# Patient Record
Sex: MALE | Race: WHITE | NOT HISPANIC OR LATINO | Employment: FULL TIME | URBAN - METROPOLITAN AREA
[De-identification: names, ages, dates, MRNs, and addresses within clinical notes are randomized per-mention and may not be internally consistent; named-entity substitution may affect disease eponyms.]

---

## 2017-04-10 ENCOUNTER — OFFICE VISIT (OUTPATIENT)
Dept: FAMILY MEDICINE | Facility: CLINIC | Age: 52
End: 2017-04-10
Payer: COMMERCIAL

## 2017-04-10 VITALS
WEIGHT: 208.9 LBS | SYSTOLIC BLOOD PRESSURE: 139 MMHG | HEART RATE: 95 BPM | HEIGHT: 66 IN | DIASTOLIC BLOOD PRESSURE: 87 MMHG | TEMPERATURE: 98.7 F | BODY MASS INDEX: 33.57 KG/M2

## 2017-04-10 DIAGNOSIS — Z00.01 ENCOUNTER FOR ROUTINE ADULT MEDICAL EXAM WITH ABNORMAL FINDINGS: ICD-10-CM

## 2017-04-10 DIAGNOSIS — Z12.11 SPECIAL SCREENING FOR MALIGNANT NEOPLASMS, COLON: Primary | ICD-10-CM

## 2017-04-10 DIAGNOSIS — Z13.6 CARDIOVASCULAR SCREENING; LDL GOAL LESS THAN 130: ICD-10-CM

## 2017-04-10 DIAGNOSIS — Z00.00 ROUTINE GENERAL MEDICAL EXAMINATION AT A HEALTH CARE FACILITY: ICD-10-CM

## 2017-04-10 PROCEDURE — 99386 PREV VISIT NEW AGE 40-64: CPT | Performed by: FAMILY MEDICINE

## 2017-04-10 NOTE — NURSING NOTE
Chief Complaint   Patient presents with     Physical       Initial There were no vitals taken for this visit. There is no height or weight on file to calculate BMI.  Medication Reconciliation: complete   Gracy Pearson CMA

## 2017-04-10 NOTE — PROGRESS NOTES
SUBJECTIVE:     CC: Tan Redman is an 52 year old male who presents for preventative health visit.     Healthy Habits:  Annual Exam:  Getting at least 3 servings of Calcium per day:: NO  Bi-annual eye exam:: NO  Dental care twice a year:: NO  Sleep apnea or symptoms of sleep apnea:: None  Diet:: Regular (no restrictions)  Frequency of exercise:: None  Taking medications regularly:: Not Applicable  Medication side effects:: Not applicable  Additional concerns today:: No  Q1: Little interest or pleasure in doing things: 0=Not at all  Q2: Feeling down, depressed or hopeless: 0=Not at all  PHQ-2 Score: 0            Today's PHQ-2 Score:   PHQ-2 ( 1999 Pf[employer]) 4/8/2017   Little interest or pleasure in doing things Not at all   Feeling down, depressed or hopeless Not at all   PHQ-2 Score 0       Abuse: Current or Past(Physical, Sexual or Emotional)- No  Do you feel safe in your environment - Yes    Social History   Substance Use Topics     Smoking status: Never Smoker     Smokeless tobacco: Not on file     Alcohol use Yes      Comment: occassionally     The patient does not drink >3 drinks per day nor >7 drinks per week.    Last PSA: No results found for: PSA     No results for input(s): CHOL, HDL, LDL, TRIG, CHOLHDLRATIO, NHDL in the last 72321 hours.    Reviewed orders with patient. Reviewed health maintenance and updated orders accordingly - Yes    Reviewed and updated as needed this visit by clinical staff  Tobacco  Allergies  Med Hx  Surg Hx  Fam Hx  Soc Hx        Reviewed and updated as needed this visit by Provider            ROS:  C: NEGATIVE for fever, chills, change in weight  I: NEGATIVE for worrisome rashes, moles or lesions  E: NEGATIVE for vision changes or irritation  ENT: NEGATIVE for ear, mouth and throat problems  R: NEGATIVE for significant cough or SOB  CV: NEGATIVE for chest pain, palpitations or peripheral edema  GI: NEGATIVE for nausea, abdominal pain, heartburn, or change in bowel habits    "male: negative for dysuria, hematuria, decreased urinary stream, erectile dysfunction, urethral discharge  M: NEGATIVE for significant arthralgias or myalgia  N: NEGATIVE for weakness, dizziness or paresthesias  P: NEGATIVE for changes in mood or affect    Problem list, Medication list, Allergies, and Medical/Social/Surgical histories reviewed in [facility] and updated as appropriate.  OBJECTIVE:     /87 (BP Location: Right arm, Patient Position: Chair, Cuff Size: Adult Large)  Pulse 95  Temp 98.7  F (37.1  C) (Tympanic)  Ht 5' 5.5\" (1.664 m)  Wt 208 lb 14.4 oz (94.8 kg)  BMI 34.23 kg/m2  EXAM:  GENERAL: healthy, alert and no distress  NECK: no adenopathy, no asymmetry, masses, or scars and thyroid normal to palpation  RESP: lungs clear to auscultation - no rales, rhonchi or wheezes  CV: regular rate and rhythm, normal S1 S2, no S3 or S4, no murmur, click or rub, no peripheral edema and peripheral pulses strong  ABDOMEN: soft, nontender, no hepatosplenomegaly, no masses and bowel sounds normal  MS: no gross musculoskeletal defects noted, no edema    ASSESSMENT/PLAN:     1. Routine general medical examination at a health care facility    - GLUCOSE; Future    2. Encounter for routine adult medical exam with abnormal findings      3. Special screening for malignant neoplasms, colon    - GASTROENTEROLOGY ADULT REF PROCEDURE ONLY    4. CARDIOVASCULAR SCREENING; LDL GOAL LESS THAN 130    - Lipid Profile with reflex to direct LDL; Future    COUNSELING:  Reviewed preventive health counseling, as reflected in patient instructions         reports that he has never smoked. He does not have any smokeless tobacco history on file.    Estimated body mass index is 34.23 kg/(m^2) as calculated from the following:    Height as of this encounter: 5' 5.5\" (1.664 m).    Weight as of this encounter: 208 lb 14.4 oz (94.8 kg).   Weight management plan: Discussed healthy diet and exercise guidelines and patient will follow up in 12 " months in clinic to re-evaluate.    Counseling Resources:  ATP IV Guidelines  Pooled Cohorts Equation Calculator  FRAX Risk Assessment  ICSI Preventive Guidelines  Dietary Guidelines for Americans, 2010  USDA's MyPlate  ASA Prophylaxis  Lung CA Screening    Alcides Almanza MD  [facility] NAFISAAnswpavel for HPI/ROS submitted by the patient on 4/8/2017

## 2017-04-10 NOTE — MR AVS SNAPSHOT
After Visit Summary   4/10/2017    Tan Redmna    MRN: [id number]           Patient Information     Date Of Birth          1965        Visit Information        Provider Department      4/10/2017 5:00 PM Alcides Almanza MD [facility]        Today's Diagnoses     Special screening for malignant neoplasms, colon    -  1    Routine general medical examination at a health care facility        Encounter for routine adult medical exam with abnormal findings        CARDIOVASCULAR SCREENING; LDL GOAL LESS THAN 130          Care Instructions      Preventive Health Recommendations  Male Ages 50 - 64    Yearly exam:             See your health care provider every year in order to  o   Review health changes.   o   Discuss preventive care.    o   Review your medicines if your doctor has prescribed any.     Have a cholesterol test every 5 years, or more frequently if you are at risk for high cholesterol/heart disease.     Have a diabetes test (fasting glucose) every three years. If you are at risk for diabetes, you should have this test more often.     Have a colonoscopy at age 50, or have a yearly FIT test (stool test). These exams will check for colon cancer.      Talk with your health care provider about whether or not a prostate cancer screening test (PSA) is right for you.    You should be tested each year for STDs (sexually transmitted diseases), if you re at risk.     Shots: Get a flu shot each year. Get a tetanus shot every 10 years.     Nutrition:    Eat at least 5 servings of fruits and vegetables daily.     Eat whole-grain bread, whole-wheat pasta and brown rice instead of white grains and rice.     Talk to your provider about Calcium and Vitamin D.     Lifestyle    Exercise for at least 150 minutes a week (30 minutes a day, 5 days a week). This will help you control your weight and prevent disease.     Limit alcohol to one drink per day.     No smoking.     Wear sunscreen to prevent skin  cancer.     See your dentist every six months for an exam and cleaning.     See your eye doctor every 1 to 2 years.          Follow-ups after your visit        Additional Services     GASTROENTEROLOGY ADULT REF PROCEDURE ONLY       Last Lab Result: No results found for: CR  Body mass index is 34.23 kg/(m^2).      Patient will be contacted to schedule procedure.     Please be aware that coverage of these services is subject to the terms and limitations of your health insurance plan.  Call member services at your health plan with any benefit or coverage questions.  Any procedures must be performed at a [place] facility OR coordinated by your clinic's referral office.    Please bring the following with you to your appointment:    (1) Any X-Rays, CTs or MRIs which have been performed.  Contact the facility where they were done to arrange for  prior to your scheduled appointment.    (2) List of current medications   (3) This referral request   (4) Any documents/labs given to you for this referral                  Future tests that were ordered for you today     Open Future Orders        Priority Expected Expires Ordered    Lipid Profile with reflex to direct LDL Routine  4/10/2018 4/10/2017            Who to contact     Normal or non-critical lab and imaging results will be communicated to you by MyChart, letter or phone within 4 business days after the clinic has received the results. If you do not hear from us within 7 days, please contact the clinic through MyChart or phone. If you have a critical or abnormal lab result, we will notify you by phone as soon as possible.  Submit refill requests through [employer] or call your pharmacy and they will forward the refill request to us. Please allow 3 business days for your refill to be completed.          If you need to speak with a  for additional information , please call: [phone]             Additional Information About Your Visit       "  MyChart Information     [employer] gives you secure access to your electronic health record. If you see a primary care provider, you can also send messages to your care team and make appointments. If you have questions, please call your primary care clinic.  If you do not have a primary care provider, please call [phone] and they will assist you.        Care EveryWhere ID     This is your Care EveryWhere ID. This could be used by other organizations to access your [place] medical records  [id number]        Your Vitals Were     Pulse Temperature Height BMI (Body Mass Index)          95 98.7  F (37.1  C) (Tympanic) 5' 5.5\" (1.664 m) 34.23 kg/m2         Blood Pressure from Last 3 Encounters:   04/10/17 139/87    Weight from Last 3 Encounters:   04/10/17 208 lb 14.4 oz (94.8 kg)              We Performed the Following     GASTROENTEROLOGY ADULT REF PROCEDURE ONLY     GLUCOSE        Primary Care Provider    None Specified       No primary provider on file.        Thank you!     Thank you for choosing [facility]  for your care. Our goal is always to provide you with excellent care. Hearing back from our patients is one way we can continue to improve our services. Please take a few minutes to complete the written survey that you may receive in the mail after your visit with us. Thank you!             Your Updated Medication List - Protect others around you: Learn how to safely use, store and throw away your medicines at www.disposemymeds.org.      Notice  As of 4/10/2017  5:45 PM    You have not been prescribed any medications.      "

## 2017-04-10 NOTE — LETTER
[facility]  [address] [address]  [phone]        April 16, 2018    Tan Redman  [address] [place] [address]              Dear Tan Redman    This is to remind you that your FASTING LAB is due.    You may call our office at [phone] to schedule an appointment.    Please disregard this notice if you have already had your labs drawn or made an appointment.        Sincerely,        Alcides Almanza MD

## 2018-05-21 ENCOUNTER — TELEPHONE (OUTPATIENT)
Dept: LAB | Facility: CLINIC | Age: 53
End: 2018-05-21

## 2020-03-10 ENCOUNTER — HEALTH MAINTENANCE LETTER (OUTPATIENT)
Age: 55
End: 2020-03-10

## 2020-12-27 ENCOUNTER — HEALTH MAINTENANCE LETTER (OUTPATIENT)
Age: 55
End: 2020-12-27

## 2021-04-24 ENCOUNTER — HEALTH MAINTENANCE LETTER (OUTPATIENT)
Age: 56
End: 2021-04-24

## 2021-10-09 ENCOUNTER — HEALTH MAINTENANCE LETTER (OUTPATIENT)
Age: 56
End: 2021-10-09

## 2022-05-21 ENCOUNTER — HEALTH MAINTENANCE LETTER (OUTPATIENT)
Age: 57
End: 2022-05-21

## 2022-09-11 ENCOUNTER — HEALTH MAINTENANCE LETTER (OUTPATIENT)
Age: 57
End: 2022-09-11

## 2023-01-13 ENCOUNTER — APPOINTMENT (OUTPATIENT)
Dept: MRI IMAGING | Facility: CLINIC | Age: 58
End: 2023-01-13
Attending: PHYSICIAN ASSISTANT
Payer: COMMERCIAL

## 2023-01-13 ENCOUNTER — HOSPITAL ENCOUNTER (EMERGENCY)
Facility: CLINIC | Age: 58
Discharge: HOME | End: 2023-01-13
Attending: EMERGENCY MEDICINE | Admitting: EMERGENCY MEDICINE
Payer: COMMERCIAL

## 2023-01-13 VITALS
BODY MASS INDEX: 29.5 KG/M2 | TEMPERATURE: 98.2 F | DIASTOLIC BLOOD PRESSURE: 81 MMHG | RESPIRATION RATE: 18 BRPM | OXYGEN SATURATION: 98 % | HEART RATE: 88 BPM | SYSTOLIC BLOOD PRESSURE: 157 MMHG | WEIGHT: 180 LBS

## 2023-01-13 DIAGNOSIS — H53.9 TRANSIENT VISION DISTURBANCE OF RIGHT EYE: ICD-10-CM

## 2023-01-13 LAB
ANION GAP SERPL CALCULATED.3IONS-SCNC: 9 MMOL/L (ref 5–18)
ATRIAL RATE - MUSE: 98 BPM
BUN SERPL-MCNC: 12 MG/DL (ref 8–22)
CALCIUM SERPL-MCNC: 9 MG/DL (ref 8.5–10.5)
CHLORIDE BLD-SCNC: 104 MMOL/L (ref 98–107)
CO2 SERPL-SCNC: 26 MMOL/L (ref 22–31)
CREAT SERPL-MCNC: 1.03 MG/DL (ref 0.7–1.3)
DIASTOLIC BLOOD PRESSURE - MUSE: NORMAL MMHG
ERYTHROCYTE [DISTWIDTH] IN BLOOD BY AUTOMATED COUNT: 12.6 % (ref 10–15)
GFR SERPL CREATININE-BSD FRML MDRD: 86 ML/MIN/1.73M2
GLUCOSE BLD-MCNC: 114 MG/DL (ref 70–125)
HCT VFR BLD AUTO: 41.7 % (ref 40–53)
HGB BLD-MCNC: 14.5 G/DL (ref 13.3–17.7)
INTERPRETATION ECG - MUSE: NORMAL
MCH RBC QN AUTO: 30 PG (ref 26.5–33)
MCHC RBC AUTO-ENTMCNC: 34.8 G/DL (ref 31.5–36.5)
MCV RBC AUTO: 86 FL (ref 78–100)
P AXIS - MUSE: 50 DEGREES
PLATELET # BLD AUTO: 195 10E3/UL (ref 150–450)
POTASSIUM BLD-SCNC: 3.5 MMOL/L (ref 3.5–5)
PR INTERVAL - MUSE: 122 MS
QRS DURATION - MUSE: 74 MS
QT - MUSE: 356 MS
QTC - MUSE: 454 MS
R AXIS - MUSE: 41 DEGREES
RBC # BLD AUTO: 4.83 10E6/UL (ref 4.4–5.9)
SODIUM SERPL-SCNC: 139 MMOL/L (ref 136–145)
SYSTOLIC BLOOD PRESSURE - MUSE: NORMAL MMHG
T AXIS - MUSE: 33 DEGREES
VENTRICULAR RATE- MUSE: 98 BPM
WBC # BLD AUTO: 5.3 10E3/UL (ref 4–11)

## 2023-01-13 PROCEDURE — 93005 ELECTROCARDIOGRAM TRACING: CPT | Performed by: PHYSICIAN ASSISTANT

## 2023-01-13 PROCEDURE — 82310 ASSAY OF CALCIUM: CPT | Performed by: PHYSICIAN ASSISTANT

## 2023-01-13 PROCEDURE — A9585 GADOBUTROL INJECTION: HCPCS | Performed by: PHYSICIAN ASSISTANT

## 2023-01-13 PROCEDURE — 70553 MRI BRAIN STEM W/O & W/DYE: CPT

## 2023-01-13 PROCEDURE — 36415 COLL VENOUS BLD VENIPUNCTURE: CPT | Performed by: PHYSICIAN ASSISTANT

## 2023-01-13 PROCEDURE — 85027 COMPLETE CBC AUTOMATED: CPT | Performed by: PHYSICIAN ASSISTANT

## 2023-01-13 PROCEDURE — 255N000002 HC RX 255 OP 636: Performed by: PHYSICIAN ASSISTANT

## 2023-01-13 PROCEDURE — 99285 EMERGENCY DEPT VISIT HI MDM: CPT | Mod: 25

## 2023-01-13 PROCEDURE — 82947 ASSAY GLUCOSE BLOOD QUANT: CPT | Performed by: PHYSICIAN ASSISTANT

## 2023-01-13 PROCEDURE — 70549 MR ANGIOGRAPH NECK W/O&W/DYE: CPT

## 2023-01-13 PROCEDURE — 70544 MR ANGIOGRAPHY HEAD W/O DYE: CPT

## 2023-01-13 RX ORDER — GADOBUTROL 604.72 MG/ML
10 INJECTION INTRAVENOUS ONCE
Status: COMPLETED | OUTPATIENT
Start: 2023-01-13 | End: 2023-01-13

## 2023-01-13 RX ADMIN — GADOBUTROL 10 ML: 604.72 INJECTION INTRAVENOUS at 15:55

## 2023-01-13 ASSESSMENT — ENCOUNTER SYMPTOMS
HEADACHES: 1
DIZZINESS: 0
LIGHT-HEADEDNESS: 0

## 2023-01-13 ASSESSMENT — ACTIVITIES OF DAILY LIVING (ADL): ADLS_ACUITY_SCORE: 33

## 2023-01-13 NOTE — ED TRIAGE NOTES
"At 1245ish pt was driving and had a \"dead\" spot in vision, he believes the right eye that has now resolved.  Had a a headache yesterday but not today.  Denies dizziness or pain.  States he sat down at work and it resolved.     Triage Assessment     Row Name 01/13/23 2183       Triage Assessment (Adult)    Airway WDL WDL       Respiratory WDL    Respiratory WDL WDL       Skin Circulation/Temperature WDL    Skin Circulation/Temperature WDL WDL       Cardiac WDL    Cardiac WDL WDL       Peripheral/Neurovascular WDL    Peripheral Neurovascular WDL WDL       Cognitive/Neuro/Behavioral WDL    Cognitive/Neuro/Behavioral WDL WDL              "

## 2023-01-13 NOTE — DISCHARGE INSTRUCTIONS
The laboratory test, EKG, and MRI of your brain all appear reassuring here today in the ED.    It is recommended you take an 81 mg aspirin daily.    Please follow-up with ophthalmology and/or an optometrist for a recheck of your vision is soon as possible.  Return back to ED immediately for any worsening vision loss, blurry vision, double vision, or any other new or concerning symptoms.

## 2023-01-13 NOTE — ED NOTES
Bed: [facility]-17  Expected date:   Expected time:   Means of arrival:   Comments:   Provider assessments

## 2023-01-13 NOTE — ED PROVIDER NOTES
EMERGENCY DEPARTMENT ENCOUNTER      NAME: Tan Redman  AGE: 57 year old male  YOB: 1965  MRN: [id number]  EVALUATION DATE & TIME: No admission date for patient encounter.    PCP: No Ref-Primary, Physician    ED PROVIDER: Nabil Belcher DO      Chief Complaint   Patient presents with     Eye Problem         FINAL IMPRESSION:  1. Transient vision disturbance of right eye          ED COURSE & MEDICAL DECISION MAKIN-year-old male presented to the ED for evaluation of sudden onset vision changes that occurred while he was driving a few hours prior to ED arrival.  The patient reports did a small area in the center of his vision where he could not see anything located only in his right eye.  He denied that it was a complete loss of vision and he also stated that it was not blurry or double vision.  The vision changes lasted for approximately 30 minutes before resolving spontaneously.  The patient had no associated eye pain, headaches, numbness/tingling/weakness in his face or extremities, chest pain, or shortness of breath.  The patient did note that he had experienced a headache a couple of days ago.  Upon arrival to the ED the patient was hypertensive but he was otherwise hemodynamically stable.  At the time of my evaluation the patient did not appear to be in any obvious distress or discomfort.  The patient's physical exam including eye exam was unremarkable.  No abnormalities were noted on the funduscopic exam.      The patient's EKG revealed normal sinus rhythm with nonspecific T wave changes.  CBC and BMP were unremarkable.  MRI/MRA of the head and neck were nondiagnostic.    The patient was reevaluated and informed of the reassuring lab, EKG, and MRI/MRI results.  Exact etiology of the patient's symptoms remains unclear.  The patient was informed that his symptoms could represent a TIA.  The patient was instructed to take a daily 81 mg aspirin.  Hypertension or atypical migraine were also  discussed as possibilities. Because of the possibility of another underlying ocular etiology, the patient was instructed to follow-up with ophthalmology and/or optometry as soon as possible for reevaluation.  The patient was instructed to return back to ED medially for any repeat episodes of vision loss, blurry vision, double vision, headaches, or any other new or concerning symptoms.  The patient indicated agreement and understanding of the discharge instructions.        Pertinent Labs & Imaging studies reviewed. (See chart for details)  4:44 PM I met with the patient to gather history and to perform my initial exam. We discussed plans for the ED course, including diagnostic testing and treatment.   4:58 PM I discussed the plan for discharge with the patient, and patient is agreeable. We discussed supportive cares at home and reasons for return to the ER including new or worsening symptoms - all questions and concerns addressed. Patient to be discharged by RN.      At the conclusion of the encounter I discussed the results of all of the tests and the disposition. The questions were answered. The patient or family acknowledged understanding and was agreeable with the care plan.       PPE worn: n95 mask, goggles      Medical Decision Making    History:    Supplemental history from: Documented in HPI, if applicable    External Record(s) reviewed: Documented in HPI, if applicable.    Work Up:    Chart documentation includes differential considered and any EKGs or imaging independently interpreted by provider.    In additional to work up documented, I considered the following work up: See chart documentation, if applicable.    External consultation:    Discussion of management with another provider: See chart documentation, if applicable    Complicating factors:    Care impacted by chronic illness: N/A    Care affected by social determinants of health: N/A    Disposition considerations: Discharge. No recommendations on  "prescription strength medication(s). N/A.          MEDICATIONS GIVEN IN THE EMERGENCY:  Medications   gadobutrol (GADAVIST) injection 10 mL (10 mLs Intravenous Given 1/13/23 7026)       NEW PRESCRIPTIONS STARTED AT TODAY'S ER VISIT  There are no discharge medications for this patient.         =================================================================    HPI    Patient information was obtained from: Patient     Use of : N/A      Tan Redman is a 57 year old male with no recorded pertinent history at this time who presents to this ED by walk in for evaluation of eye problem. Patient reports having mild headaches localized to the back of his head for the past couple of days. He notes not having a noticeable headache today. Today, he states he was driving when he had a \"dead spot\" in the center of his vision in the right eye. He adds that he was able to read the the first three letters of a license plate, but then was unable to read the remaining three numbers of it. He notes that he had to turn his head slightly so that he could see it with his left eye. At work, patient noticed having persisting visual disturbance of \"deads spot\" while looking at the computer. Episode lasted for 30 minutes. Denies having complete loss of vision of right eye eye, double vision, or blurry vision. Vision in left eye is normal. Denies dizziness or light-headedness. Visual disturbance has since resolved while waiting at  ED lobby. Denies use of daily medications or changes to medications. Patient is otherwise feels normal and at baseline. Patient denies chest pain, or any additional complaints at this time.      REVIEW OF SYSTEMS   Review of Systems   Eyes: Positive for visual disturbance.   Cardiovascular: Negative for chest pain.   Neurological: Positive for headaches. Negative for dizziness and light-headedness.   All other systems reviewed and are negative.    PAST MEDICAL HISTORY:  History reviewed. No pertinent " past medical history.    PAST SURGICAL HISTORY:  History reviewed. No pertinent surgical history.    CURRENT MEDICATIONS:    No current outpatient medications on file.    ALLERGIES:  No Known Allergies    FAMILY HISTORY:  Family History   Problem Relation Age of Onset     Other Cancer Father        SOCIAL HISTORY:   Social History     Socioeconomic History     Marital status:      Spouse name: None     Number of children: None     Years of education: None     Highest education level: None   Tobacco Use     Smoking status: Never   Substance and Sexual Activity     Alcohol use: Yes     Comment: occassionally     Drug use: No     Sexual activity: Yes     Partners: Female       VITALS:  BP (!) 157/81   Pulse 88   Temp 98.2  F (36.8  C) (Oral)   Resp 18   Wt 81.6 kg (180 lb)   SpO2 98%   BMI 29.50 kg/m      PHYSICAL EXAM    General presentation: Alert, Vital signs reviewed. NAD  HENT: ENT inspection is normal. Oropharynx is moist and clear.   Eye: Pupils are equal and reactive to light. EOMI. No conjunctival erythema. No discharge, no foreign bodies noted. Fundoscopic exam appears normal.   Neck: The neck is supple, with full ROM, with no evidence of meningismus.  Pulmonary: Currently in no acute respiratory distress. Normal, non labored respirations, the lung sounds are normal with good equal air movement. Clear to auscultation bilaterally.   Circulatory: Regular rate and rhythm. Peripheral pulses are strong and equal. No murmurs, rubs, or gallops.   Abdominal: The abdomen is soft. Nontender. No rigidity, guarding, or rebound. Bowel sounds normal.   Neurologic: Alert, oriented to person, place, and time. No motor deficit. No sensory deficit. Cranial nerves II through XII are intact.  Musculoskeletal: No extremity tenderness. Full range of motion in all extremities. No extremity edema.   Skin: Skin color is normal. No rash. Warm. Dry to touch.      LAB:  All pertinent labs reviewed and interpreted.  Results  for orders placed or performed during the hospital encounter of 01/13/23   MR Brain w/o & w Contrast    Impression    IMPRESSION:  HEAD MRI:   1.  Normal head MRI.    HEAD MRA:   1.  Normal MRA [place].    NECK MRA:  1.  Normal neck MRA.   CBC (+ platelets, no diff)   Result Value Ref Range    WBC Count 5.3 4.0 - 11.0 10e3/uL    RBC Count 4.83 4.40 - 5.90 10e6/uL    Hemoglobin 14.5 13.3 - 17.7 g/dL    Hematocrit 41.7 40.0 - 53.0 %    MCV 86 78 - 100 fL    MCH 30.0 26.5 - 33.0 pg    MCHC 34.8 31.5 - 36.5 g/dL    RDW 12.6 10.0 - 15.0 %    Platelet Count 195 150 - 450 10e3/uL   Basic metabolic panel   Result Value Ref Range    Sodium 139 136 - 145 mmol/L    Potassium 3.5 3.5 - 5.0 mmol/L    Chloride 104 98 - 107 mmol/L    Carbon Dioxide (CO2) 26 22 - 31 mmol/L    Anion Gap 9 5 - 18 mmol/L    Urea Nitrogen 12 8 - 22 mg/dL    Creatinine 1.03 0.70 - 1.30 mg/dL    Calcium 9.0 8.5 - 10.5 mg/dL    Glucose 114 70 - 125 mg/dL    GFR Estimate 86 >60 mL/min/1.73m2   ECG 12-LEAD WITH MUSE (LHE)   Result Value Ref Range    Systolic Blood Pressure  mmHg    Diastolic Blood Pressure  mmHg    Ventricular Rate 98 BPM    Atrial Rate 98 BPM    NH Interval 122 ms    QRS Duration 74 ms     ms    QTc 454 ms    P Axis 50 degrees    R AXIS 41 degrees    T Axis 33 degrees    Interpretation ECG       Sinus rhythm  Nonspecific T wave abnormality  Abnormal ECG  No previous ECGs available  Confirmed by SEE ED PROVIDER NOTE FOR, ECG INTERPRETATION ([id number]),  ARIANA THOMPSON ([id number]) on 1/13/2023 2:01:23 PM         RADIOLOGY:  Reviewed all pertinent imaging. Please see official radiology report.  MR Brain w/o & w Contrast   Final Result   IMPRESSION:   HEAD MRI:    1.  Normal head MRI.      HEAD MRA:    1.  Normal MRA [place].      NECK MRA:   1.  Normal neck MRA.      MRA Brain ([place]) wo Contrast    (Results Pending)   MRA Neck (Carot[place]) wo & w Contrast    (Results Pending)       EKG:    Normal sinus rhythm.   Rate of 98.  Normal QRS.  Normal QT.  Nonspecific T wave changes noted.  No previous EKG available for comparison.    I have independently reviewed and interpreted the EKG(s) documented above.        I, Ziggy Frazier, am serving as a scribe to document services personally performed by Nabil Belcher DO based on my observation and the provider's statements to me. I, Nabil Belcher, attest that Ziggy Frazier is acting in a scribe capacity, has observed my performance of the services and has documented them in accordance with my direction.    Nabil Belcher DO  Emergency Medicine  [facility] EMERGENCY ROOM  [address] [id number] [phone]     Nabil Belcher DO  01/13/23 9380

## 2023-06-03 ENCOUNTER — HEALTH MAINTENANCE LETTER (OUTPATIENT)
Age: 58
End: 2023-06-03

## 2024-07-13 ENCOUNTER — HEALTH MAINTENANCE LETTER (OUTPATIENT)
Age: 59
End: 2024-07-13

## 2025-07-19 ENCOUNTER — HEALTH MAINTENANCE LETTER (OUTPATIENT)
Age: 60
End: 2025-07-19